# Patient Record
Sex: MALE | Race: WHITE | Employment: FULL TIME | URBAN - METROPOLITAN AREA
[De-identification: names, ages, dates, MRNs, and addresses within clinical notes are randomized per-mention and may not be internally consistent; named-entity substitution may affect disease eponyms.]

---

## 2021-02-26 DIAGNOSIS — Z31.448 ENCOUNTER FOR OTHER GENETIC TESTING OF MALE FOR PROCREATIVE MANAGEMENT: Primary | ICD-10-CM

## 2021-02-26 DIAGNOSIS — Z31.448 ENCOUNTER FOR OTHER GENETIC TESTING OF MALE FOR PROCREATIVE MANAGEMENT: ICD-10-CM

## 2021-02-26 PROCEDURE — 999N001127 HC STATISTIC COUNSYL FAMILY PREP: Performed by: OBSTETRICS & GYNECOLOGY

## 2021-02-26 NOTE — PROGRESS NOTES
[facility]  Genetic Counseling Consult    Patient:  Abner Lopez YOB: 1980   Date of Service:  02/26/21      Abner was seen at the [facility] for genetic consultation to discuss the option of carrier screening.         Impression/Plan:   1. Abner elected to pursue expanded carrier screening as part of his partner, Kaela's ongoing pregnancy management. A sample was drawn today and sent to [facility] laboratory. Results are expected within 2-3 weeks. We will contact him to discuss the results. Abner provided verbal permission for results to be left in his voicemail. Authorization to share protected health information was signed today for us to discuss results with Kaela.       Expanded carrier screening for mutations in a large panel of genes associated with autosomal recessive conditions including cystic fibrosis, thalassemias, hearing loss, spinal muscular atrophy, and others, is now available. We also reviewed that expanded carrier screening can assess for common X-linked recessive disorders such as Fragile X syndrome.       We discussed that expanded carrier screening is designed to identify carrier status for conditions that are primarily childhood or adolescent onset. Expanded carrier screening does not evaluate for adult-onset conditions such as hereditary cancer syndromes, dementia/ Alzheimer's disease, or cardiovascular disease risk factors. Additionally, expanded carrier screening is not comprehensive for all known genetic diseases or inherited conditions. This is a screening test, and residual carrier status risk figures will be provided to the patient after results become available. Carrier screening is not meant to diagnose the patient with a condition, and generally carriers are asymptomatic. However, certain genes may confer increased risks for various health concerns in carriers (PUJA, FMR1).    It  was a pleasure to be involved with [facility]. Face-to-face time of the meeting was 45 minutes.    Akila Guerra MS, [facility]  Licensed Genetic Counselor  [facility]  Maternal Fetal Medicine  [email or web address]  [phone]

## 2021-03-11 ENCOUNTER — TELEPHONE (OUTPATIENT)
Dept: MATERNAL FETAL MEDICINE | Facility: CLINIC | Age: 41
End: 2021-03-11

## 2021-03-11 LAB — LAB SCANNED RESULT: NORMAL

## 2021-03-11 NOTE — TELEPHONE ENCOUNTER
March 11, 2021     I left a message for Berny's partner, Kaela, regarding her and Berny's [employer] carrier screening results, which were negative. She and Berny were not found to be a carrier for the 176 recessive and x-linked genetic conditions, including fragile X. This significantly reduces their risk to have a child with any of these conditions.       Results will be available in his EPIC chart for his primary care provider to review.    Akila Guerra MS, [facility]  Licensed Genetic Counselor  [facility]  Maternal Fetal Medicine  [email or web address]  [phone]